# Patient Record
Sex: MALE | Race: WHITE
[De-identification: names, ages, dates, MRNs, and addresses within clinical notes are randomized per-mention and may not be internally consistent; named-entity substitution may affect disease eponyms.]

---

## 2018-10-05 ENCOUNTER — HOSPITAL ENCOUNTER (EMERGENCY)
Dept: HOSPITAL 62 - ER | Age: 38
LOS: 1 days | Discharge: HOME | End: 2018-10-06
Payer: SELF-PAY

## 2018-10-05 DIAGNOSIS — F17.200: ICD-10-CM

## 2018-10-05 DIAGNOSIS — F14.10: ICD-10-CM

## 2018-10-05 DIAGNOSIS — F15.11: ICD-10-CM

## 2018-10-05 DIAGNOSIS — F32.9: Primary | ICD-10-CM

## 2018-10-05 LAB
ADD MANUAL DIFF: NO
ALBUMIN SERPL-MCNC: 4.3 G/DL (ref 3.5–5)
ALP SERPL-CCNC: 67 U/L (ref 38–126)
ALT SERPL-CCNC: 32 U/L (ref 21–72)
ANION GAP SERPL CALC-SCNC: 11 MMOL/L (ref 5–19)
APAP SERPL-MCNC: < 10 UG/ML (ref 10–30)
AST SERPL-CCNC: 31 U/L (ref 17–59)
BASOPHILS # BLD AUTO: 0.1 10^3/UL (ref 0–0.2)
BASOPHILS NFR BLD AUTO: 0.9 % (ref 0–2)
BILIRUB DIRECT SERPL-MCNC: 0.2 MG/DL (ref 0–0.4)
BILIRUB SERPL-MCNC: 0.6 MG/DL (ref 0.2–1.3)
BUN SERPL-MCNC: 17 MG/DL (ref 7–20)
CALCIUM: 8.7 MG/DL (ref 8.4–10.2)
CHLORIDE SERPL-SCNC: 103 MMOL/L (ref 98–107)
CO2 SERPL-SCNC: 26 MMOL/L (ref 22–30)
EOSINOPHIL # BLD AUTO: 0.1 10^3/UL (ref 0–0.6)
EOSINOPHIL NFR BLD AUTO: 1.2 % (ref 0–6)
ERYTHROCYTE [DISTWIDTH] IN BLOOD BY AUTOMATED COUNT: 13.9 % (ref 11.5–14)
ETHANOL SERPL-MCNC: < 10 MG/DL
GLUCOSE SERPL-MCNC: 88 MG/DL (ref 75–110)
HCT VFR BLD CALC: 39.4 % (ref 37.9–51)
HGB BLD-MCNC: 14 G/DL (ref 13.5–17)
LYMPHOCYTES # BLD AUTO: 2.4 10^3/UL (ref 0.5–4.7)
LYMPHOCYTES NFR BLD AUTO: 29.4 % (ref 13–45)
MCH RBC QN AUTO: 32 PG (ref 27–33.4)
MCHC RBC AUTO-ENTMCNC: 35.5 G/DL (ref 32–36)
MCV RBC AUTO: 90 FL (ref 80–97)
MONOCYTES # BLD AUTO: 0.9 10^3/UL (ref 0.1–1.4)
MONOCYTES NFR BLD AUTO: 10.6 % (ref 3–13)
NEUTROPHILS # BLD AUTO: 4.7 10^3/UL (ref 1.7–8.2)
NEUTS SEG NFR BLD AUTO: 57.9 % (ref 42–78)
PLATELET # BLD: 199 10^3/UL (ref 150–450)
POTASSIUM SERPL-SCNC: 3.9 MMOL/L (ref 3.6–5)
PROT SERPL-MCNC: 7 G/DL (ref 6.3–8.2)
RBC # BLD AUTO: 4.36 10^6/UL (ref 4.35–5.55)
SALICYLATES SERPL-MCNC: < 1 MG/DL (ref 2–20)
SODIUM SERPL-SCNC: 139.6 MMOL/L (ref 137–145)
TOTAL CELLS COUNTED % (AUTO): 100 %
WBC # BLD AUTO: 8.2 10^3/UL (ref 4–10.5)

## 2018-10-05 PROCEDURE — 93005 ELECTROCARDIOGRAM TRACING: CPT

## 2018-10-05 PROCEDURE — 93010 ELECTROCARDIOGRAM REPORT: CPT

## 2018-10-05 PROCEDURE — 80307 DRUG TEST PRSMV CHEM ANLYZR: CPT

## 2018-10-05 PROCEDURE — 99285 EMERGENCY DEPT VISIT HI MDM: CPT

## 2018-10-05 PROCEDURE — 85025 COMPLETE CBC W/AUTO DIFF WBC: CPT

## 2018-10-05 PROCEDURE — 80053 COMPREHEN METABOLIC PANEL: CPT

## 2018-10-05 PROCEDURE — 36415 COLL VENOUS BLD VENIPUNCTURE: CPT

## 2018-10-05 PROCEDURE — 81001 URINALYSIS AUTO W/SCOPE: CPT

## 2018-10-05 NOTE — ER DOCUMENT REPORT
ED General





- General


Chief Complaint: Psych Problem


Stated Complaint: PSYCH PROBLEM


Time Seen by Provider: 10/05/18 22:47


Notes: 





Patient is a 38-year-old male with a prior medical history of methamphetamine 

abuse and depression who presents with progressively worsening depression over 

the past several weeks.  The patient reports that he has become increasingly 

depressed, recently quit using methamphetamine which he states he was using to 

control his depression.  States he has not seen his children in 4 years, 

attributes a large portion of his depression to lack of contact with his 

children.  States he used to be on Zoloft which helped control his depression 

but has been off this for quite some time.  He states he currently has passive 

suicidal ideation "something fell out of this kind killed me I would not mind" 

but denies any specific plan or intent to kill himself.  Nothing is new or 

different regarding his symptoms tonight that prompted a visit to the emergency 

department.  He does not have a primary care doctor or psychiatrist.


TRAVEL OUTSIDE OF THE [place] IN LAST 30 DAYS: No





Past Medical History





- General


Information source: Patient





- Social History


Smoking Status: Current Every Day Smoker


Chew tobacco use (# tins/day): No


Frequency of alcohol use: Occasional


Drug Abuse: Methamphetamine


Lives with: Alone


Family History: Reviewed & Not Pertinent


Patient has suicidal ideation: No


Patient has homicidal ideation: No


Renal/ Medical History: Denies: Hx Peritoneal Dialysis





Review of Systems





- Review of Systems


Notes: 





Constitutional: Negative for fever.


HENT: Negative for sore throat.


Eyes: Negative for visual changes.


Cardiovascular: Negative for chest pain.


Respiratory: Negative for shortness of breath.


Gastrointestinal: Negative for abdominal pain, vomiting or diarrhea.


Genitourinary: Negative for dysuria.


Musculoskeletal: Negative for back pain.


Skin: Negative for rash.


Neurological: Negative for headaches, weakness or numbness.





10 point ROS negative except as marked above and in HPI.





Physical Exam





- Vital signs


Vitals: 


 











Temp Pulse Resp BP Pulse Ox


 


 98 F   88   18   117/90 H  97 


 


 10/05/18 22:10  10/05/18 22:10  10/05/18 22:10  10/05/18 22:10  10/05/18 22:10











Interpretation: Normal


Notes: 





PHYSICAL EXAMINATION:





GENERAL: Well-appearing, well-nourished and in no acute distress.





HEAD: Atraumatic, normocephalic.





EYES: Pupils equal round and reactive to light, extraocular movements intact, 

sclera anicteric, conjunctiva are normal.





ENT: nares patent, oropharynx clear without exudates.  Moist mucous membranes.





NECK: Normal range of motion, supple without lymphadenopathy





LUNGS: Breath sounds clear to auscultation bilaterally and equal.  No wheezes 

rales or rhonchi.





HEART: Regular rate and rhythm without murmurs





ABDOMEN: Soft, nontender, normoactive bowel sounds.  No guarding, no rebound.  

No masses appreciated.





EXTREMITIES: Normal range of motion, no pitting or edema.  No cyanosis.





NEUROLOGICAL: No focal neurological deficits. Moves all extremities 

spontaneously and on command.





PSYCH: Normal mood, normal affect.





SKIN: Warm, Dry, normal turgor, no rashes or lesions noted.





Course





- Re-evaluation


Re-evalutation: 





10/05/18 23:18


Patient presents with depression with passive suicidal thoughts but no specific 

plans, means or intention by which he would complete suicide.  Denies acute 

medical complaints.  Medical screening exam and labs are unremarkable.  The 

patient does not meet involuntary commitment criteria.  He has elected to 

remain in the emergency department and speak with behavioral health in the 

morning.





- Vital Signs


Vital signs: 


 











Temp Pulse Resp BP Pulse Ox


 


 98 F   88   18   117/90 H  97 


 


 10/05/18 22:10  10/05/18 22:10  10/05/18 22:10  10/05/18 22:10  10/05/18 22:10














- Laboratory


Result Diagrams: 


 10/05/18 23:20





 10/05/18 23:20


Laboratory results interpreted by me: 


 











  10/05/18





  23:20


 


Salicylates  < 1.0 L


 


Acetaminophen  < 10 L














- EKG Interpretation by Me


Additional EKG results interpreted by me: 





10/06/18 04:00


Sinus rhythm.  Rate 77.  No ST elevations or depressions.  QTC is 467.





Discharge





- Discharge


Clinical Impression: 


 Methamphetamine abuse





Depression


Qualifiers:


 Depression Type: unspecified Qualified Code(s): F32.9 - Major depressive 

disorder, single episode, unspecified





Condition: Fair

## 2018-10-06 VITALS — SYSTOLIC BLOOD PRESSURE: 118 MMHG | DIASTOLIC BLOOD PRESSURE: 72 MMHG

## 2018-10-06 LAB
APPEARANCE UR: (no result)
APTT PPP: YELLOW S
BARBITURATES UR QL SCN: NEGATIVE
BILIRUB UR QL STRIP: NEGATIVE
GLUCOSE UR STRIP-MCNC: NEGATIVE MG/DL
KETONES UR STRIP-MCNC: NEGATIVE MG/DL
METHADONE UR QL SCN: NEGATIVE
NITRITE UR QL STRIP: NEGATIVE
PCP UR QL SCN: NEGATIVE
PH UR STRIP: 5 [PH] (ref 5–9)
PROT UR STRIP-MCNC: NEGATIVE MG/DL
SP GR UR STRIP: 1.02
URINE AMPHETAMINES SCREEN: NEGATIVE
URINE BENZODIAZEPINES SCREEN: NEGATIVE
URINE COCAINE SCREEN: (no result)
URINE MARIJUANA (THC) SCREEN: NEGATIVE
UROBILINOGEN UR-MCNC: NEGATIVE MG/DL (ref ?–2)

## 2018-10-06 NOTE — EKG REPORT
SEVERITY:- ABNORMAL ECG -

SINUS RHYTHM

LEFT AXIS DEVIATION

LEFT VENTRICULAR HYPERTROPHY

ST ELEV, PROBABLE NORMAL EARLY REPOL PATTERN

:

Confirmed by: Josee Car 06-Oct-2018 19:20:31

## 2018-10-06 NOTE — ER DOCUMENT REPORT
Doctor's Note


Notes: 





10/06/18 10:08


Rounds: Chart reviewed and patient interviewed.  Patient being evaluated for 

suicidal ideation and substance abuse.  Says he has been depressed for a long 

time and is getting worse over the past few weeks.  Has been using 

methamphetamine to treat his depression.  Vital signs are all essentially 

normal.  Lab studies were positive for cocaine and for opioids.  Patient 

appears to be medically stable for transfer or discharge.


EULA Cardenas MD
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
